# Patient Record
Sex: FEMALE | Race: WHITE | Employment: PART TIME | URBAN - METROPOLITAN AREA
[De-identification: names, ages, dates, MRNs, and addresses within clinical notes are randomized per-mention and may not be internally consistent; named-entity substitution may affect disease eponyms.]

---

## 2018-04-09 ENCOUNTER — OFFICE VISIT (OUTPATIENT)
Dept: ENT CLINIC | Age: 48
End: 2018-04-09

## 2018-04-09 VITALS — SYSTOLIC BLOOD PRESSURE: 118 MMHG | DIASTOLIC BLOOD PRESSURE: 80 MMHG

## 2018-04-09 DIAGNOSIS — K12.30 MUCOSITIS: ICD-10-CM

## 2018-04-09 DIAGNOSIS — J34.3 HYPERTROPHY OF INFERIOR NASAL TURBINATE: Primary | ICD-10-CM

## 2018-04-09 PROCEDURE — 99213 OFFICE O/P EST LOW 20 MIN: CPT | Performed by: OTOLARYNGOLOGY

## 2018-04-09 RX ORDER — FLUTICASONE PROPIONATE 50 MCG
2 SPRAY, SUSPENSION (ML) NASAL DAILY
Qty: 1 BOTTLE | Refills: 1 | Status: SHIPPED | OUTPATIENT
Start: 2018-04-09

## 2018-06-06 ENCOUNTER — OFFICE VISIT (OUTPATIENT)
Dept: ENT CLINIC | Age: 48
End: 2018-06-06

## 2018-06-06 VITALS — SYSTOLIC BLOOD PRESSURE: 120 MMHG | DIASTOLIC BLOOD PRESSURE: 70 MMHG

## 2018-06-06 DIAGNOSIS — J34.89 NASAL CAVITY MASS: Primary | ICD-10-CM

## 2018-06-06 PROCEDURE — 99213 OFFICE O/P EST LOW 20 MIN: CPT | Performed by: OTOLARYNGOLOGY

## 2018-07-03 ENCOUNTER — TELEPHONE (OUTPATIENT)
Dept: ENT CLINIC | Age: 48
End: 2018-07-03

## 2018-10-11 ENCOUNTER — HOSPITAL ENCOUNTER (OUTPATIENT)
Dept: MAMMOGRAPHY | Age: 48
Setting detail: THERAPIES SERIES
Discharge: HOME | End: 2018-10-11
Payer: COMMERCIAL

## 2018-10-11 DIAGNOSIS — Z12.31 ENCOUNTER FOR SCREENING MAMMOGRAM FOR BREAST CANCER: ICD-10-CM

## 2018-10-11 PROCEDURE — 77067 SCR MAMMO BI INCL CAD: CPT

## 2020-07-10 ENCOUNTER — HOSPITAL ENCOUNTER (OUTPATIENT)
Dept: MAMMOGRAPHY | Age: 50
Discharge: HOME | End: 2020-07-10
Payer: COMMERCIAL

## 2020-07-10 PROCEDURE — 77067 SCR MAMMO BI INCL CAD: CPT

## 2022-02-10 ENCOUNTER — HOSPITAL ENCOUNTER (OUTPATIENT)
Dept: MAMMOGRAPHY | Age: 52
Discharge: HOME | End: 2022-02-10
Payer: COMMERCIAL

## 2022-02-10 VITALS — HEIGHT: 59 IN | BODY MASS INDEX: 32.05 KG/M2 | WEIGHT: 159 LBS

## 2022-02-10 DIAGNOSIS — Z12.31 ENCOUNTER FOR SCREENING MAMMOGRAM FOR BREAST CANCER: ICD-10-CM

## 2022-02-10 PROCEDURE — 77063 BREAST TOMOSYNTHESIS BI: CPT

## 2023-06-16 ENCOUNTER — HOSPITAL ENCOUNTER (OUTPATIENT)
Age: 53
Setting detail: OUTPATIENT SURGERY
Discharge: HOME | End: 2023-06-16
Attending: INTERNAL MEDICINE | Admitting: INTERNAL MEDICINE
Payer: COMMERCIAL

## 2023-06-16 VITALS
OXYGEN SATURATION: 100 % | SYSTOLIC BLOOD PRESSURE: 127 MMHG | DIASTOLIC BLOOD PRESSURE: 92 MMHG | HEIGHT: 60 IN | HEART RATE: 61 BPM | TEMPERATURE: 96.8 F | BODY MASS INDEX: 30.43 KG/M2 | RESPIRATION RATE: 16 BRPM | WEIGHT: 155 LBS

## 2023-06-16 DIAGNOSIS — Z12.11 COLON CANCER SCREENING: ICD-10-CM

## 2023-06-16 DIAGNOSIS — K20.0 EOSINOPHILIC ESOPHAGITIS: ICD-10-CM

## 2023-06-16 DIAGNOSIS — R13.14 PHARYNGOESOPHAGEAL DYSPHAGIA: ICD-10-CM

## 2023-06-16 LAB — HCG UR QL: NEGATIVE

## 2023-06-16 PROCEDURE — 3609012400 HC EGD TRANSORAL BIOPSY SINGLE/MULTIPLE: Performed by: INTERNAL MEDICINE

## 2023-06-16 PROCEDURE — 84703 CHORIONIC GONADOTROPIN ASSAY: CPT

## 2023-06-16 PROCEDURE — 88305 TISSUE EXAM BY PATHOLOGIST: CPT

## 2023-06-16 PROCEDURE — 3700000001 HC ADD 15 MINUTES (ANESTHESIA): Performed by: INTERNAL MEDICINE

## 2023-06-16 PROCEDURE — C1769 GUIDE WIRE: HCPCS | Performed by: INTERNAL MEDICINE

## 2023-06-16 PROCEDURE — 3700000000 HC ANESTHESIA ATTENDED CARE: Performed by: INTERNAL MEDICINE

## 2023-06-16 PROCEDURE — 2580000003 HC RX 258: Performed by: ANESTHESIOLOGY

## 2023-06-16 PROCEDURE — 7100000011 HC PHASE II RECOVERY - ADDTL 15 MIN: Performed by: INTERNAL MEDICINE

## 2023-06-16 PROCEDURE — 3609010600 HC COLONOSCOPY POLYPECTOMY SNARE/COLD BIOPSY: Performed by: INTERNAL MEDICINE

## 2023-06-16 PROCEDURE — 2709999900 HC NON-CHARGEABLE SUPPLY: Performed by: INTERNAL MEDICINE

## 2023-06-16 PROCEDURE — 7100000010 HC PHASE II RECOVERY - FIRST 15 MIN: Performed by: INTERNAL MEDICINE

## 2023-06-16 RX ORDER — SODIUM CHLORIDE, SODIUM LACTATE, POTASSIUM CHLORIDE, CALCIUM CHLORIDE 600; 310; 30; 20 MG/100ML; MG/100ML; MG/100ML; MG/100ML
INJECTION, SOLUTION INTRAVENOUS CONTINUOUS
Status: DISCONTINUED | OUTPATIENT
Start: 2023-06-16 | End: 2023-06-16 | Stop reason: HOSPADM

## 2023-06-16 RX ORDER — LEVOCETIRIZINE DIHYDROCHLORIDE 5 MG/1
5 TABLET, FILM COATED ORAL EVERY EVENING
COMMUNITY
Start: 2023-04-24

## 2023-06-16 RX ORDER — DICLOFENAC SODIUM 75 MG/1
TABLET, DELAYED RELEASE ORAL
COMMUNITY
Start: 2023-03-13

## 2023-06-16 RX ADMIN — SODIUM CHLORIDE, POTASSIUM CHLORIDE, SODIUM LACTATE AND CALCIUM CHLORIDE: 600; 310; 30; 20 INJECTION, SOLUTION INTRAVENOUS at 08:19

## 2023-06-16 ASSESSMENT — PAIN SCALES - GENERAL: PAINLEVEL_OUTOF10: 0

## 2023-06-16 ASSESSMENT — PAIN - FUNCTIONAL ASSESSMENT: PAIN_FUNCTIONAL_ASSESSMENT: 0-10

## 2023-06-16 NOTE — H&P
Gastroenterology Note             Pre-operative History and Physical    Patient: Kayce Lopez  : 1970  CSN: [id number]    History Obtained From:  Patient and/or guardian. HISTORY OF PRESENT ILLNESS:    Indication: The patient is a 50 y.o. female  here for EGD and colonoscopy. History of eosinophilic esophagitis with slowly progressive pharyngoesophageal dysphagia to solids greater than liquids. Need for colon cancer screening with average risk. Past Medical History:    History reviewed. No pertinent past medical history. Past Surgical History:    Past Surgical History:   Procedure Laterality Date    ENDOSCOPY, COLON, DIAGNOSTIC      TONSILLECTOMY       Medications Prior to Admission:   No current facility-administered medications on file prior to encounter. Current Outpatient Medications on File Prior to Encounter   Medication Sig Dispense Refill    levocetirizine (XYZAL) 5 MG tablet Take 1 tablet by mouth every evening      diclofenac (VOLTAREN) 75 MG EC tablet       fluticasone (FLONASE) 50 MCG/ACT nasal spray 2 sprays by Nasal route daily 1 Bottle 1    mupirocin (BACTROBAN) 2 % ointment Apply topically daily. Apply with Q tip in right nostril. 15 g 0    cetirizine (ZYRTEC) 10 MG tablet Take 1 tablet by mouth      amphetamine-dextroamphetamine (ADDERALL) 20 MG tablet Take 1 tablet by mouth daily. Allergies:  Oxycodone-acetaminophen      Social History:   Social History     Tobacco Use    Smoking status: Never    Smokeless tobacco: Never   Substance Use Topics    Alcohol use: Yes     Family History:   History reviewed. No pertinent family history.     PHYSICAL EXAM:      BP (!) 110/92   Pulse 74   Temp 97.3 °F (36.3 °C) (Temporal)   Resp 16   Ht 5' (1.524 m)   Wt 155 lb (70.3 kg)   LMP 2023   SpO2 99%   BMI 30.27 kg/m²  I        Heart:   RRR, normal s1s2    Lungs:  CTA bilat,  Normal effort    Abdomen:   NT, ND      ASA Grade:  ASA 2 - Patient with mild systemic

## 2023-06-16 NOTE — OP NOTE
EGD and Colonoscopy Procedure  Note            Patient: Nishi French  : 1970  CSN: [id number]    Procedure: Esophagogastroduodenoscopy with biopsy and bougie dilation and Colonoscopy with cold snare polypectomy      Date:  2023     Surgeon:  Wing Jake MD     Referring Physician:   Neha Malloy MD    Preoperative Diagnosis: Esophageal dysphagia, eosinophilic esophagitis    Postoperative Diagnosis: Possible short segment Paz's esophagus, stigmata of eosinophilic esophagitis, gastric erythema, colon polyps x2    Anesthesia:  MAC per anesthesia record     EBL: <5 mL    Indications: This is a 50y.o. year old female who presents today with chronic history of eosinophilic esophagitis currently controlled with dietary therapy noting progressive increase in pharyngoesophageal dysphagia. Need for colon cancer screening with average risk. EGD Procedure Details:    Informed consent was obtained from the patient after explanation of indications, benefits and possible risks and complications of the procedures. The patient was then taken to the endoscopy suite, placed in the left lateral decubitus position, and the above IV sedation was administrered. The Olympus gastroscope was passed through the hypopharynx into the esophagus. The scope was advanced to the second portion of the duodenum. The mucosa was carefully examined, including gastric retroflexion. The scope was withdrawn and the procedure was terminated with findings as indicated below:     EGD Findings:     Esophagus:   -Z-line was noted at 35 cm with no apparent hiatal hernia seen.  -Slightly raised islands of salmon-colored mucosa noted to extend for 1 cm proximal to the top of the gastric folds.   Biopsies obtained with cold forceps and sent for histology.  -Stigmata of eosinophilic esophagitis noted along the entire length of the esophagus characterized by subtle linear furrows, scant adherent exudate, pale mucosa, and

## 2023-06-16 NOTE — DISCHARGE INSTRUCTIONS
ENDOSCOPY DISCHARGE INSTRUCTIONS:    Call the physician that did your procedure for any questions or concern:    [facility]: [phone]  DR. AMMON LI      ACTIVITY:    There are potential side effects to the medications used for sedation and anesthesia during your procedure. These include:  Dizziness or light-headedness, confusion or memory loss, delayed reaction times, loss of coordination, nausea and vomiting. Because of your increased risk for injury, we ask that you observe the following precautions: For the next 24 hours,  DO NOT operate an automobile, bicycle, motorcycle, , power tools or large equipment of any kind. Do not drink alcohol, sign any legal documents or make any legal decisions for 24 hours. Do not bend your head over lower than your heart. DO sit on the side of bed/couch awhile before getting up. Plan on bedrest or quiet relaxation today. You may resume normal activities in 24 hours. DIET:    Your first meal today should be light, avoiding spicy and fatty foods. If you tolerate this first meal, then you may advance to your regular diet unless otherwise advised by your physician. NORMAL SYMPTOMS:  -Mild sore throat if youve had an EGD   -Gaseous discomfort    NOTIFY YOUR PHYSICIAN IF THESE SYMPTOMS OCCUR:  1. Fever (greater than 100)  5. Increased abdominal bloating  2. Severe pain    6. Excessive bleeding  3. Nausea and vomiting  7. Chest pain                                                                    4. Chills    8. Shortness of breath    ADDITIONAL INSTRUCTIONS:    Biopsy results: Call [address],[facility] for biopsy results in 1 week    Educational Information:          Please review these discharge instructions this evening or tomorrow for  information you may have forgotten. We want to thank you for choosing the [facility] as your health care provider. We always strive to provide you with excellent care while you are here.  You may

## 2023-06-16 NOTE — PROGRESS NOTES
Ambulatory Surgery/Procedure Discharge Note    Vitals:    06/16/23 0934   BP: 127/84   Pulse: 73   Resp: 16   Temp: 96.8 °F (36 °C)   SpO2: 98%       In: 700 [I.V.:700]  Out: 0     Restroom use offered before discharge. Yes    Pain assessment:  none  Pain Level: 0    Discharge instructions reviewed with patient/responsible adult and understanding verbalized. Discharge instructions signed and copies given. Patient to be discharged home with belongings.  at bedside. Patient discharged to home/self care.  Patient discharged via wheel chair by transporter to waiting family/S.O.       6/16/2023 9:36 AM

## 2023-12-29 ENCOUNTER — HOSPITAL ENCOUNTER (OUTPATIENT)
Dept: MAMMOGRAPHY | Age: 53
Discharge: HOME | End: 2023-12-29
Payer: COMMERCIAL

## 2023-12-29 VITALS — HEIGHT: 59 IN | BODY MASS INDEX: 30.84 KG/M2 | WEIGHT: 153 LBS

## 2023-12-29 DIAGNOSIS — Z12.31 SCREENING MAMMOGRAM FOR BREAST CANCER: ICD-10-CM

## 2023-12-29 PROCEDURE — 77067 SCR MAMMO BI INCL CAD: CPT

## 2025-07-03 ENCOUNTER — HOSPITAL ENCOUNTER (OUTPATIENT)
Dept: MAMMOGRAPHY | Age: 55
Discharge: HOME | End: 2025-07-03
Payer: COMMERCIAL

## 2025-07-03 DIAGNOSIS — Z12.31 ENCOUNTER FOR SCREENING MAMMOGRAM FOR BREAST CANCER: ICD-10-CM

## 2025-07-03 PROCEDURE — 77063 BREAST TOMOSYNTHESIS BI: CPT

## (undated) DEVICE — TRAP SPEC RETRV CLR PLAS POLYP IN LN SUCT QUIK CTCH

## (undated) DEVICE — SNARE COLD DIAMOND 10MM THIN

## (undated) DEVICE — SAVARY GILLIARD WIRE GUIDE: Brand: SAVARY GILLIARD

## (undated) DEVICE — FORCEPS BX L240CM JAW DIA2.8MM L CAP W/ NDL MIC MESH TOOTH